# Patient Record
Sex: FEMALE | Race: BLACK OR AFRICAN AMERICAN
[De-identification: names, ages, dates, MRNs, and addresses within clinical notes are randomized per-mention and may not be internally consistent; named-entity substitution may affect disease eponyms.]

---
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
## 2019-11-07 ENCOUNTER — HOSPITAL ENCOUNTER (EMERGENCY)
Dept: HOSPITAL 92 - SCSER | Age: 19
Discharge: HOME | End: 2019-11-07
Payer: SELF-PAY

## 2019-11-07 DIAGNOSIS — R55: Primary | ICD-10-CM

## 2019-11-07 LAB
ALBUMIN SERPL BCG-MCNC: 4.1 G/DL (ref 3.5–5)
ALP SERPL-CCNC: 60 U/L (ref 40–100)
ALT SERPL W P-5'-P-CCNC: 8 U/L (ref 8–55)
ANION GAP SERPL CALC-SCNC: 12 MMOL/L (ref 10–20)
AST SERPL-CCNC: 14 U/L (ref 5–34)
BASOPHILS # BLD AUTO: 0.1 THOU/UL (ref 0–0.2)
BASOPHILS NFR BLD AUTO: 0.8 % (ref 0–1)
BILIRUB SERPL-MCNC: 0.2 MG/DL (ref 0.2–1.2)
BUN SERPL-MCNC: 15 MG/DL (ref 7–18.7)
CALCIUM SERPL-MCNC: 9.4 MG/DL (ref 7.8–10.44)
CHLORIDE SERPL-SCNC: 107 MMOL/L (ref 98–107)
CO2 SERPL-SCNC: 26 MMOL/L (ref 22–29)
CREAT CL PREDICTED SERPL C-G-VRATE: 0 ML/MIN (ref 70–130)
EOSINOPHIL # BLD AUTO: 0.1 THOU/UL (ref 0–0.7)
EOSINOPHIL NFR BLD AUTO: 1 % (ref 0–10)
GLOBULIN SER CALC-MCNC: 3.2 G/DL (ref 2.4–3.5)
GLUCOSE SERPL-MCNC: 91 MG/DL (ref 70–105)
HGB BLD-MCNC: 11.9 G/DL (ref 12–16)
LYMPHOCYTES # BLD: 2.5 THOU/UL (ref 1.2–3.4)
LYMPHOCYTES NFR BLD AUTO: 30 % (ref 28–48)
MCH RBC QN AUTO: 25.3 PG (ref 25–35)
MCV RBC AUTO: 80.8 FL (ref 78–98)
MONOCYTES # BLD AUTO: 0.5 THOU/UL (ref 0.11–0.59)
MONOCYTES NFR BLD AUTO: 5.7 % (ref 0–4)
NEUTROPHILS # BLD AUTO: 5.2 THOU/UL (ref 1.4–6.5)
NEUTROPHILS NFR BLD AUTO: 62.5 % (ref 31–61)
PLATELET # BLD AUTO: 266 THOU/UL (ref 130–400)
POTASSIUM SERPL-SCNC: 3.8 MMOL/L (ref 3.5–5.1)
PREGS CONTROL BACKGROUND?: (no result)
PREGS CONTROL BAR APPEAR?: YES
RBC # BLD AUTO: 4.69 MILL/UL (ref 4–5.2)
SODIUM SERPL-SCNC: 141 MMOL/L (ref 136–145)
WBC # BLD AUTO: 8.3 THOU/UL (ref 4.8–10.8)

## 2019-11-07 PROCEDURE — 80053 COMPREHEN METABOLIC PANEL: CPT

## 2019-11-07 PROCEDURE — 85025 COMPLETE CBC W/AUTO DIFF WBC: CPT

## 2019-11-07 PROCEDURE — 93005 ELECTROCARDIOGRAM TRACING: CPT

## 2019-11-07 PROCEDURE — 84703 CHORIONIC GONADOTROPIN ASSAY: CPT

## 2023-10-30 NOTE — OP
DATE OF PROCEDURE:  2018

 

PREOPERATIVE DIAGNOSES:

1.  A 39-week pregnancy.

2.  Severe preeclampsia.

3.  Arrest of dilation.

4.  Chorioamnionitis.

 

POSTOPERATIVE DIAGNOSES:

1.  A 39-week pregnancy.

2.  Severe preeclampsia.

3.  Arrest of dilation.

4.  Chorioamnionitis.

 

PROCEDURE PERFORMED:  Primary low cervical transverse  section.

 

SURGEON:  Sivakumar Alonso M.D.

 

FIRST ASSISTANT:  Analia Lipscomb M.D.

 

ANESTHESIA:  General endotracheal anesthesia.

 

DESCRIPTION OF EVENTS:  After informed consent was obtained from the patient, she was taken to the OR
, where her epidural was bolused.  She was prepped and draped in the usual sterile fashion.  The epid
ural was inadequate for surgery.  General anesthesia was administered.  A Pfannenstiel incision was c
reated with a #10 scalpel blade and carried down to the fascia, which was incised transversely with M
dewayne scissors.  The superior fascial segment was elevated with Kochers and the underlying rectus muscl
es were dissected away bluntly and then sharply with Jimenez scissors.  This was repeated with the infer
ior fascial segment similarly.  The rectus muscles were divided in the midline bluntly.  The peritone
um was entered bluntly.  The bladder blade was inserted.  The uterus was entered in a low transverse 
fashion with a clean #10 scalpel blade.  Clear amniotic fluid was encountered.  The hysterotomy was e
xtended superolaterally with blunt dissection.  Fetal vertex was delivered onto the operative field f
ollowed by the remainder of the infant, which was delivered uneventfully and atraumatically.  The kamaljit
pharynx and nares were bulb suctioned.  Cord was clamped x2 and cut and a vigorous male infant was ha
nded off to the  staff in attendance.  Cord blood and cord ABGs were obtained.  The placenta 
was expressed with uterine massage and sent to pathology for review.  The uterus was exteriorized and
 cleared of clots and debris.  The uterus was noted to be somewhat atonic and responded well to Hemab
ate x1.  The uterus was repaired with a running locking suture of 0 Vicryl in a single full-thickness
 layer.  A series of figure-of-eight sutures were placed along the incision line for hemostasis, whic
h was observed.  The abdomen was copiously irrigated with saline.  The uterus was returned to the abd
omen.  Hemostasis of the incision was again seen.  The peritoneum was repaired with a running suture 
of 3-0 Vicryl.  The rectus muscles were inspected and found to be hemostatic.  The fascia was repaire
d with a running suture of 0 PDS.  Three interrupted sutures of 3-0 Vicryl were placed subdermally to
 reapproximate the skin which was closed with skin staples.  Sponge and instrument counts were correc
t x3.  She tolerated the procedure well and suffered no acute complications.  She was taken to recove
 in stable condition and the infant to the nursery also in stable condition.

 

FINDINGS:  Viable male infant, 8 pounds 3 ounces, Apgars 8 and 9 at 1 and 5 minutes respectively.

 

SPECIMEN:  Placenta to pathology.  Cord arterial pH 7.3.

 

ESTIMATED BLOOD LOSS:  800 mL
31-Oct-2023 19:08

## 2025-07-10 ENCOUNTER — APPOINTMENT (OUTPATIENT)
Dept: URBAN - METROPOLITAN AREA CLINIC 99 | Facility: CLINIC | Age: 25
Setting detail: DERMATOLOGY
End: 2025-07-10

## 2025-07-10 DIAGNOSIS — L91.0 HYPERTROPHIC SCAR: ICD-10-CM | Status: INADEQUATELY CONTROLLED

## 2025-07-10 DIAGNOSIS — Z71.89 OTHER SPECIFIED COUNSELING: ICD-10-CM

## 2025-07-10 DIAGNOSIS — L68.0 HIRSUTISM: ICD-10-CM | Status: INADEQUATELY CONTROLLED

## 2025-07-10 PROCEDURE — ? COUNSELING

## 2025-07-10 PROCEDURE — ? TREATMENT REGIMEN

## 2025-07-10 PROCEDURE — ? PRESCRIPTION

## 2025-07-10 PROCEDURE — ? ADDITIONAL NOTES

## 2025-07-10 PROCEDURE — ? SUNSCREEN RECOMMENDATIONS

## 2025-07-10 RX ORDER — EFLORNITHINE HCL MONOHYDRATE 100 %
HALF PEA SIZE AMOUNT POWDER (GRAM) MISCELLANEOUS DAILY
Qty: 25 | Refills: 0 | Status: ERX | COMMUNITY
Start: 2025-07-10

## 2025-07-10 RX ORDER — SPIRONOLACTONE 50 MG/1
1 TABLET, FILM COATED ORAL BID
Qty: 180 | Refills: 0 | Status: ERX | COMMUNITY
Start: 2025-07-10

## 2025-07-10 RX ADMIN — Medication HALF PEA SIZE AMOUNT: at 00:00

## 2025-07-10 RX ADMIN — SPIRONOLACTONE 1: 50 TABLET, FILM COATED ORAL at 00:00

## 2025-07-10 ASSESSMENT — LOCATION SIMPLE DESCRIPTION DERM
LOCATION SIMPLE: LEFT CHEEK
LOCATION SIMPLE: SUBMENTAL CHIN

## 2025-07-10 ASSESSMENT — LOCATION DETAILED DESCRIPTION DERM
LOCATION DETAILED: LEFT CENTRAL MANDIBULAR CHEEK
LOCATION DETAILED: SUBMENTAL CHIN

## 2025-07-10 ASSESSMENT — LOCATION ZONE DERM: LOCATION ZONE: FACE

## 2025-07-10 NOTE — PROCEDURE: TREATMENT REGIMEN
Initiate Treatment: eflornithine HCl monohydrate (bulk) 100 % powder Daily: Apply half Pea size amount to areas of unwanted hair growth daily\\n\\nspironolactone 50 mg tablet BID: Take one tablet by mouth twice daily.
Detail Level: Zone

## 2025-07-10 NOTE — PROCEDURE: ADDITIONAL NOTES
Additional Notes: Discussed risks and benefits of an ILK injection. Pt would like to proceed injection at her follow up appointment.
Detail Level: Simple
Render Risk Assessment In Note?: no
Additional Notes: Discussed risks and benefits of Elfornithine topical. Discussed risks and benefits of spironolactone. Pt would like to proceed with both medications. Pt advised to discontinue medications if she became pregnant or is trying to become pregnant.